# Patient Record
Sex: FEMALE | Race: BLACK OR AFRICAN AMERICAN | URBAN - METROPOLITAN AREA
[De-identification: names, ages, dates, MRNs, and addresses within clinical notes are randomized per-mention and may not be internally consistent; named-entity substitution may affect disease eponyms.]

---

## 2024-02-07 ENCOUNTER — APPOINTMENT (OUTPATIENT)
Dept: URBAN - METROPOLITAN AREA CLINIC 247 | Age: 64
Setting detail: DERMATOLOGY
End: 2024-02-07

## 2024-02-07 DIAGNOSIS — R21 RASH AND OTHER NONSPECIFIC SKIN ERUPTION: ICD-10-CM

## 2024-02-07 PROCEDURE — OTHER ADDITIONAL NOTES: OTHER

## 2024-02-07 PROCEDURE — A4550 SURGICAL TRAYS: HCPCS

## 2024-02-07 PROCEDURE — 11104 PUNCH BX SKIN SINGLE LESION: CPT

## 2024-02-07 PROCEDURE — OTHER BIOPSY BY PUNCH METHOD: OTHER

## 2024-02-07 ASSESSMENT — LOCATION ZONE DERM: LOCATION ZONE: LEG

## 2024-02-07 ASSESSMENT — LOCATION DETAILED DESCRIPTION DERM: LOCATION DETAILED: LEFT LATERAL DISTAL PRETIBIAL REGION

## 2024-02-07 ASSESSMENT — LOCATION SIMPLE DESCRIPTION DERM: LOCATION SIMPLE: LEFT PRETIBIAL REGION

## 2024-02-07 NOTE — HPI: WOUND
Is This A New Presentation, Or A Follow-Up?: Wound
Date Of Injury: 10/2023
Type Of Injury: Small scrape on a bed railing

## 2024-02-07 NOTE — PROCEDURE: ADDITIONAL NOTES
Detail Level: Simple
Additional Notes: Pt reports ulcer developed after bumping into her bed in September\\nNo known triggers otherwise\\nPt recently went to [facility] and was given clindamycin \\nPt reports improvement\\nNo pain or itch currently \\nRecommend biopsy for diagnosis\\nSER including worsening of ulcer and infection\\nApply vaseline liberally and cover with bandage\\nElevate leg when at home and wear compression bandage or ACE wraps
Render Risk Assessment In Note?: no

## 2024-02-07 NOTE — PROCEDURE: BIOPSY BY PUNCH METHOD
Render Path Notes In Note?: No
Punch Size In Mm: 4
Post-Care Instructions: Patient is to keep the biopsy site dry overnight, and then gently wash the area with soap once daily and apply Vaseline and a bandage once daily until sutures are removed. Stay out of standing water such as pools and lakes to avoid an increased risk of infection.
Wound Care: Petrolatum
Notification Instructions: Patient will be notified of biopsy results. However, patient should call the office if not contacted within 2 weeks.
Hemostasis: None
Size Of Lesion In Cm (Optional): 0
Anesthesia Volume In Cc: 2
Consent: Written consent was obtained for risks including, but not limited to, scarring, infection, bleeding, scabbing, incomplete removal, nerve damage and allergy to anesthesia.
Depth Of Punch Biopsy: dermis
Anesthesia Type: 1% lidocaine with epinephrine
Lab Facility: [id number]
Dressing: bandage
Detail Level: Detailed
Lab: [id number]
Epidermal Sutures: 4-0 Ethilon
Billing Type: Third-Party Bill
Biopsy Type: H and E
Suture Removal: 10 days
Was A Bandage Applied: Yes
Information: Selecting Yes will display possible errors in your note based on the variables you have selected. This validation is only offered as a suggestion for you. PLEASE NOTE THAT THE VALIDATION TEXT WILL BE REMOVED WHEN YOU FINALIZE YOUR NOTE. IF YOU WANT TO FAX A PRELIMINARY NOTE YOU WILL NEED TO TOGGLE THIS TO 'NO' IF YOU DO NOT WANT IT IN YOUR FAXED NOTE.

## 2024-02-16 ENCOUNTER — RX ONLY (RX ONLY)
Age: 64
End: 2024-02-16

## 2024-02-16 RX ORDER — CLOBETASOL PROPIONATE 0.5 MG/G
CREAM TOPICAL
Qty: 60 | Refills: 1 | Status: ERX | COMMUNITY
Start: 2024-02-16

## 2024-03-13 ENCOUNTER — APPOINTMENT (OUTPATIENT)
Dept: URBAN - METROPOLITAN AREA CLINIC 247 | Age: 64
Setting detail: DERMATOLOGY
End: 2024-03-13

## 2024-03-13 DIAGNOSIS — I78.8 OTHER DISEASES OF CAPILLARIES: ICD-10-CM

## 2024-03-13 DIAGNOSIS — L91.0 HYPERTROPHIC SCAR: ICD-10-CM

## 2024-03-13 PROBLEM — D48.5 NEOPLASM OF UNCERTAIN BEHAVIOR OF SKIN: Status: ACTIVE | Noted: 2024-03-13

## 2024-03-13 PROCEDURE — 11900 INJECT SKIN LESIONS </W 7: CPT

## 2024-03-13 PROCEDURE — OTHER DIAGNOSIS COMMENT: OTHER

## 2024-03-13 PROCEDURE — OTHER COUNSELING: OTHER

## 2024-03-13 PROCEDURE — OTHER INTRALESIONAL KENALOG: OTHER

## 2024-03-13 PROCEDURE — OTHER PRESCRIPTION MEDICATION MANAGEMENT: OTHER

## 2024-03-13 PROCEDURE — 99213 OFFICE O/P EST LOW 20 MIN: CPT | Mod: 25

## 2024-03-13 PROCEDURE — OTHER ADDITIONAL NOTES: OTHER

## 2024-03-13 PROCEDURE — OTHER PHOTO-DOCUMENTATION: OTHER

## 2024-03-13 ASSESSMENT — LOCATION SIMPLE DESCRIPTION DERM: LOCATION SIMPLE: LEFT PRETIBIAL REGION

## 2024-03-13 ASSESSMENT — LOCATION DETAILED DESCRIPTION DERM
LOCATION DETAILED: LEFT LATERAL DISTAL PRETIBIAL REGION
LOCATION DETAILED: LEFT DISTAL PRETIBIAL REGION

## 2024-03-13 ASSESSMENT — LOCATION ZONE DERM: LOCATION ZONE: LEG

## 2024-03-13 NOTE — PROCEDURE: DIAGNOSIS COMMENT
Detail Level: Simple
Comment: Biopsy-proven (2/7/24)
Render Risk Assessment In Note?: no
Comment: NOT A KELOID - Unable to add ILK plan under correct diagnosis.

## 2024-03-13 NOTE — PROCEDURE: INTRALESIONAL KENALOG
Include Z78.9 (Other Specified Conditions Influencing Health Status) As An Associated Diagnosis?: No
Expiration Date For Kenalog (Optional): 12/2025
How Many Mls Were Removed From The 10 Mg/Ml (5ml) Vial When Preparing The Injectable Solution?: 0
Kenalog Type Of Vial: Multiple Dose
Concentration Of Kenalog Solution Injected (Mg/Ml): 10.0
Detail Level: Detailed
Treatment Number (Optional): 1
Ndc# For Kenalog Only: 6771-0619-07
Kenalog Preparation: Kenalog
Validate Note Data When Using Inventory: Yes
Medical Necessity Clause: This procedure was medically necessary because the lesions that were treated were:
Total Volume (Ccs): 1.5
Administered By (Optional): LYNDA
Lot # For Kenalog (Optional): [id number]
Show Inventory Tab: Hide
Consent: The risks of atrophy were reviewed with the patient.

## 2024-03-13 NOTE — PROCEDURE: PRESCRIPTION MEDICATION MANAGEMENT
Continue Regimen: Clobetasol 0.05% cream to edges of ulcer 3x a week. No RF needed.
Detail Level: Zone
Render In Strict Bullet Format?: No

## 2024-03-13 NOTE — PROCEDURE: ADDITIONAL NOTES
Additional Notes: Discussed biopsy results and treatment options.\\nDiscussed oral prednisone vs ILK vs oral dapsone; SER. \\nPt prefers to start with ILK.\\nILK injection performed today.\\nContinue compression socks.\\nElevate legs when at home\\nAdvised to keep ulcer covered with Vaseline and a band-aid.\\n\\nDapsone start up next visit if desired:\\nBaseline labs - CBC w/ diff, CMP, G6PD levels\\nMonitoring - CBC w/ diff Q week x 4 weeks, then Q 2 weeks x 12 weeks\\nCMP - 3 months after starting
Render Risk Assessment In Note?: no
Detail Level: Simple